# Patient Record
Sex: FEMALE | Race: WHITE | NOT HISPANIC OR LATINO | Employment: UNEMPLOYED | URBAN - METROPOLITAN AREA
[De-identification: names, ages, dates, MRNs, and addresses within clinical notes are randomized per-mention and may not be internally consistent; named-entity substitution may affect disease eponyms.]

---

## 2018-08-11 ENCOUNTER — OFFICE VISIT (OUTPATIENT)
Dept: URGENT CARE | Facility: CLINIC | Age: 23
End: 2018-08-11
Payer: COMMERCIAL

## 2018-08-11 ENCOUNTER — HOSPITAL ENCOUNTER (EMERGENCY)
Facility: MEDICAL CENTER | Age: 23
End: 2018-08-11
Attending: EMERGENCY MEDICINE
Payer: COMMERCIAL

## 2018-08-11 ENCOUNTER — APPOINTMENT (OUTPATIENT)
Dept: RADIOLOGY | Facility: MEDICAL CENTER | Age: 23
End: 2018-08-11
Attending: EMERGENCY MEDICINE
Payer: COMMERCIAL

## 2018-08-11 VITALS
TEMPERATURE: 97.4 F | OXYGEN SATURATION: 98 % | BODY MASS INDEX: 23.71 KG/M2 | HEART RATE: 80 BPM | HEIGHT: 64 IN | DIASTOLIC BLOOD PRESSURE: 78 MMHG | WEIGHT: 138.89 LBS | SYSTOLIC BLOOD PRESSURE: 112 MMHG | RESPIRATION RATE: 18 BRPM

## 2018-08-11 VITALS
SYSTOLIC BLOOD PRESSURE: 120 MMHG | WEIGHT: 140 LBS | OXYGEN SATURATION: 98 % | BODY MASS INDEX: 23.9 KG/M2 | HEIGHT: 64 IN | TEMPERATURE: 98.1 F | DIASTOLIC BLOOD PRESSURE: 80 MMHG | RESPIRATION RATE: 20 BRPM | HEART RATE: 88 BPM

## 2018-08-11 DIAGNOSIS — H92.01 RIGHT EAR PAIN: ICD-10-CM

## 2018-08-11 DIAGNOSIS — R59.0 LYMPHADENOPATHY OF RIGHT CERVICAL REGION: ICD-10-CM

## 2018-08-11 LAB
ANION GAP SERPL CALC-SCNC: 6 MMOL/L (ref 0–11.9)
BASOPHILS # BLD AUTO: 0.2 % (ref 0–1.8)
BASOPHILS # BLD: 0.02 K/UL (ref 0–0.12)
BUN SERPL-MCNC: 9 MG/DL (ref 8–22)
CALCIUM SERPL-MCNC: 9 MG/DL (ref 8.4–10.2)
CHLORIDE SERPL-SCNC: 104 MMOL/L (ref 96–112)
CO2 SERPL-SCNC: 26 MMOL/L (ref 20–33)
CREAT SERPL-MCNC: 0.61 MG/DL (ref 0.5–1.4)
EOSINOPHIL # BLD AUTO: 0.05 K/UL (ref 0–0.51)
EOSINOPHIL NFR BLD: 0.5 % (ref 0–6.9)
ERYTHROCYTE [DISTWIDTH] IN BLOOD BY AUTOMATED COUNT: 42.3 FL (ref 35.9–50)
GLUCOSE SERPL-MCNC: 95 MG/DL (ref 65–99)
HCT VFR BLD AUTO: 41.5 % (ref 37–47)
HETEROPH AB SER QL LA: NEGATIVE
HGB BLD-MCNC: 14.2 G/DL (ref 12–16)
IMM GRANULOCYTES # BLD AUTO: 0.03 K/UL (ref 0–0.11)
IMM GRANULOCYTES NFR BLD AUTO: 0.3 % (ref 0–0.9)
INT CON NEG: NORMAL
INT CON POS: NORMAL
LYMPHOCYTES # BLD AUTO: 1.85 K/UL (ref 1–4.8)
LYMPHOCYTES NFR BLD: 19.2 % (ref 22–41)
MCH RBC QN AUTO: 31 PG (ref 27–33)
MCHC RBC AUTO-ENTMCNC: 34.2 G/DL (ref 33.6–35)
MCV RBC AUTO: 90.6 FL (ref 81.4–97.8)
MONOCYTES # BLD AUTO: 0.61 K/UL (ref 0–0.85)
MONOCYTES NFR BLD AUTO: 6.3 % (ref 0–13.4)
NEUTROPHILS # BLD AUTO: 7.07 K/UL (ref 2–7.15)
NEUTROPHILS NFR BLD: 73.5 % (ref 44–72)
NRBC # BLD AUTO: 0 K/UL
NRBC BLD-RTO: 0 /100 WBC
PLATELET # BLD AUTO: 157 K/UL (ref 164–446)
PMV BLD AUTO: 10.7 FL (ref 9–12.9)
POTASSIUM SERPL-SCNC: 3.7 MMOL/L (ref 3.6–5.5)
RBC # BLD AUTO: 4.58 M/UL (ref 4.2–5.4)
SODIUM SERPL-SCNC: 136 MMOL/L (ref 135–145)
WBC # BLD AUTO: 9.6 K/UL (ref 4.8–10.8)

## 2018-08-11 PROCEDURE — 36415 COLL VENOUS BLD VENIPUNCTURE: CPT

## 2018-08-11 PROCEDURE — 86308 HETEROPHILE ANTIBODY SCREEN: CPT | Performed by: PHYSICIAN ASSISTANT

## 2018-08-11 PROCEDURE — 76536 US EXAM OF HEAD AND NECK: CPT

## 2018-08-11 PROCEDURE — 700102 HCHG RX REV CODE 250 W/ 637 OVERRIDE(OP): Performed by: EMERGENCY MEDICINE

## 2018-08-11 PROCEDURE — 85025 COMPLETE CBC W/AUTO DIFF WBC: CPT

## 2018-08-11 PROCEDURE — A9270 NON-COVERED ITEM OR SERVICE: HCPCS | Performed by: EMERGENCY MEDICINE

## 2018-08-11 PROCEDURE — 99284 EMERGENCY DEPT VISIT MOD MDM: CPT

## 2018-08-11 PROCEDURE — 99203 OFFICE O/P NEW LOW 30 MIN: CPT | Performed by: PHYSICIAN ASSISTANT

## 2018-08-11 PROCEDURE — 80048 BASIC METABOLIC PNL TOTAL CA: CPT

## 2018-08-11 RX ORDER — ACETAMINOPHEN 325 MG/1
650 TABLET ORAL EVERY 6 HOURS PRN
Qty: 30 TAB | Refills: 0 | Status: SHIPPED | OUTPATIENT
Start: 2018-08-11

## 2018-08-11 RX ORDER — AMOXICILLIN 250 MG/1
250 CAPSULE ORAL 3 TIMES DAILY
COMMUNITY

## 2018-08-11 RX ORDER — IBUPROFEN 800 MG/1
800 TABLET ORAL EVERY 8 HOURS PRN
Qty: 30 TAB | Refills: 0 | Status: SHIPPED | OUTPATIENT
Start: 2018-08-11

## 2018-08-11 RX ORDER — PSEUDOEPHEDRINE HCL 120 MG/1
120 TABLET, FILM COATED, EXTENDED RELEASE ORAL 2 TIMES DAILY PRN
Qty: 30 TAB | Refills: 6 | Status: SHIPPED | OUTPATIENT
Start: 2018-08-11 | End: 2018-08-16

## 2018-08-11 RX ORDER — HYDROCODONE BITARTRATE AND ACETAMINOPHEN 5; 325 MG/1; MG/1
1 TABLET ORAL ONCE
Status: COMPLETED | OUTPATIENT
Start: 2018-08-11 | End: 2018-08-11

## 2018-08-11 RX ORDER — CLINDAMYCIN HYDROCHLORIDE 300 MG/1
300 CAPSULE ORAL 4 TIMES DAILY
Qty: 28 CAP | Refills: 0 | Status: SHIPPED | OUTPATIENT
Start: 2018-08-11 | End: 2018-08-18

## 2018-08-11 RX ADMIN — HYDROCODONE BITARTRATE AND ACETAMINOPHEN 1 TABLET: 5; 325 TABLET ORAL at 21:15

## 2018-08-11 ASSESSMENT — ENCOUNTER SYMPTOMS
HEADACHES: 0
DIZZINESS: 0
ABDOMINAL PAIN: 0
HEADACHES: 0
NAUSEA: 0
SORE THROAT: 0
VOMITING: 0
DIARRHEA: 1
FEVER: 0
WEIGHT LOSS: 0
SHORTNESS OF BREATH: 0
FEVER: 0
BLURRED VISION: 0
NECK PAIN: 0
CHILLS: 0
CHILLS: 0
VOMITING: 0
COUGH: 0

## 2018-08-11 ASSESSMENT — PAIN SCALES - GENERAL
PAINLEVEL_OUTOF10: 4
PAINLEVEL_OUTOF10: 9

## 2018-08-11 NOTE — PROGRESS NOTES
"Subjective:      Suyapa Patricia is a 22 y.o. female who presents with Otalgia (Over a week right ear pain )            Subjective: Patient is a 22-year-old female who comes in with right ear pain and right neck pain. It is been 8 days and getting worse. She was seen at [facility] and told she had a right ear infection about a week ago. She believes she was put on amoxicillin/clavulanate. She's been taking the medication but states symptoms have worsened. She reports pain that is just below the ear and extends down the right side of her neck. She denies pain with swallowing. She denies ringing of the ear, decreased hearing. She has not had any fever, chills, nauseousness or vomiting. Denies pain with eating or chewing. Reports the pain about a 7 or 8 out of 10. It's rated as a moderate severity. Nothing makes better and nothing makes it worse    Past medical history: Is not pertinent to this examination  Past surgical history: Not pertinent to this examination  Family history: Is not pertinent to this examination  Allergies: No known drug allergies  Social history: Is reviewed in Epic        Otalgia    Pertinent negatives include no abdominal pain, coughing, ear discharge, headaches, hearing loss or vomiting.       Review of Systems   Constitutional: Negative for chills and fever.   HENT: Positive for ear pain. Negative for congestion, ear discharge, hearing loss and tinnitus.    Eyes: Negative for blurred vision.   Respiratory: Negative for cough.    Cardiovascular: Negative for chest pain.   Gastrointestinal: Negative for abdominal pain, nausea and vomiting.   Genitourinary: Negative.    Neurological: Negative for dizziness and headaches.          Objective:     /80   Pulse 88   Temp 36.7 °C (98.1 °F)   Resp 20   Ht 1.626 m (5' 4\")   Wt 63.5 kg (140 lb)   SpO2 98%   BMI 24.03 kg/m²      Physical Exam       Gen.: Patient is A and O ×3, no acute distress, well-nourished " well-hydrated  Vitals: Are listed and unremarkable  HEENT: Heads normocephalic, atraumatic, PERRLA, extraocular movements intact, , right TM is bulging but there is no redness or pus behind the TM. Oropharynx clear  Neck: Soft supple with significant right sided anterior, tender, cervical lymphadenopathy. There is no redness to the neck or swelling externally. But she has palpable right-sided tender anterior cervical lymphadenopathy  Cardiovascular: Regular rate and rhythm normal S1 and S2. No murmurs, rubs or gallops  Lungs are clear to auscultation bilaterally. no wheezes rales or rhonchi  Abdomen is soft, nontender, nondistended with good bowel sounds, no hepatosplenomegaly  Skin: Is well perfused without evidence of rash or lesions  Neurological:  cranial nerves II through XII were assessed and intact.  Musculoskeletal: Full range of motion, 5 out of 5 strength against resistance  Neurovascularly: Intact with a 2 second cap refill, good distal pulses    Urgent care course: Monospot was negative     Assessment/Plan:     1. Lymphadenopathy of right cervical region  Appears that The patient was on Augmentin and is failing this. Discuss stopping that medication and starting clindamycin. Take ibuprofen 600 mg up to 3 times a day as needed. If symptoms are persisting or worsening in the next 24-48 hours please go straight to the emergency room  - clindamycin (CLEOCIN) 300 MG Cap; Take 1 Cap by mouth 4 times a day for 7 days.  Dispense: 28 Cap; Refill: 0

## 2018-08-12 NOTE — ED PROVIDER NOTES
CHIEF COMPLAINT  Chief Complaint   Patient presents with   • Ear Pain   • Lymphadenopathy       HPI    Suyapa Patricia is a 22 y.o. female presenting with right neck swelling and ear pain.  Patient diagnosed with ear infection 1.5 weeks ago completed a course of amoxicillin, or Augmentin, unclear.  Patient has a continued earache, right-sided, 6/10 intensity, no modifying factors, constant.  States Tylenol and ibuprofen are not helping.  Feels that she has worsening swelling on the right side of her neck at her jawline.  Patient states strep test and mono test negative.  Also having mild diarrhea.   Denies sore throat, fevers, sudden weight loss, chills or night sweats, cough, difficulty swallowing.        REVIEW OF SYSTEMS  See HPI for further details.     Review of Systems   Constitutional: Negative for chills, fever and weight loss.   HENT: Negative for sore throat.    Respiratory: Negative for shortness of breath.    Cardiovascular: Negative for chest pain.   Gastrointestinal: Positive for diarrhea. Negative for vomiting.   Musculoskeletal: Negative for neck pain.   Skin: Negative for rash.   Neurological: Negative for headaches.   All other systems reviewed and are negative.      PAST MEDICAL HISTORY       SOCIAL HISTORY  Social History     Social History Main Topics   • Smoking status: Never Smoker   • Smokeless tobacco: Never Used   • Alcohol use No   • Drug use: No   • Sexual activity: Not on file       SURGICAL HISTORY  patient denies any surgical history    CURRENT MEDICATIONS  Home Medications     Reviewed by Tr Jackson R.N. (Registered Nurse) on 08/11/18 at 2013  Med List Status: Partial   Medication Last Dose Status   amoxicillin (AMOXIL) 250 MG Cap 8/10/2018 Active   clindamycin (CLEOCIN) 300 MG Cap 8/11/2018 Active   cyclobenzaprine (FLEXERIL) 5 MG tablet Not Taking Active   cyclobenzaprine (FLEXERIL) 5 MG tablet Not taking Active   hydrocodone/acetaminophen (NORCO)  MG TABS Not Taking  "Active   ibuprofen (MOTRIN) 800 MG TABS PRN Active   oxycodone-acetaminophen (PERCOCET) 7.5-325 MG per tablet  Active                ALLERGIES  No Known Allergies    PHYSICAL EXAM  VITAL SIGNS: /86   Pulse 88   Temp 36.4 °C (97.6 °F)   Resp 18   Ht 1.626 m (5' 4\")   Wt 63 kg (138 lb 14.2 oz)   SpO2 98%   BMI 23.84 kg/m²    SpO2: I interpret this pulse oximetry as normal  Physical Exam   Constitutional: She is oriented to person, place, and time and well-developed, well-nourished, and in no distress.   HENT:   Head: Normocephalic and atraumatic.   Mouth/Throat: Oropharynx is clear and moist. No oropharyngeal exudate.   Bilateral TMs normal  Uvula midline, no tonsillar swelling or mass noted, no erythema or exudate  Right anterior tender lymphadenopathy, no area of fluctuance, no erythema or induration  No TMJ tenderness  No submandibular swelling or signs of Jesse's angina  No trismus  No decayed teeth or gingival swelling concerning for apical abscess  No mastoid tenderness or swelling   Eyes: Pupils are equal, round, and reactive to light. Conjunctivae and EOM are normal.   Neck: Normal range of motion. Neck supple. No thyromegaly present.   Cardiovascular: Normal rate, regular rhythm and normal heart sounds.    No murmur heard.  Pulmonary/Chest: Effort normal and breath sounds normal. No respiratory distress. She has no wheezes. She has no rales.   Abdominal: Soft. Bowel sounds are normal. She exhibits no distension. There is no tenderness.   Musculoskeletal: Normal range of motion. She exhibits no edema or deformity.   Neurological: She is alert and oriented to person, place, and time.   Skin: Skin is warm and dry. She is not diaphoretic. No erythema.   Psychiatric: Affect and judgment normal.   Nursing note and vitals reviewed.      DIAGNOSTIC STUDIES / PROCEDURES      Results for orders placed or performed during the hospital encounter of 08/11/18   CBC WITH DIFFERENTIAL   Result Value Ref Range " "   WBC 9.6 4.8 - 10.8 K/uL    RBC 4.58 4.20 - 5.40 M/uL    Hemoglobin 14.2 12.0 - 16.0 g/dL    Hematocrit 41.5 37.0 - 47.0 %    MCV 90.6 81.4 - 97.8 fL    MCH 31.0 27.0 - 33.0 pg    MCHC 34.2 33.6 - 35.0 g/dL    RDW 42.3 35.9 - 50.0 fL    Platelet Count 157 (L) 164 - 446 K/uL    MPV 10.7 9.0 - 12.9 fL    Neutrophils-Polys 73.50 (H) 44.00 - 72.00 %    Lymphocytes 19.20 (L) 22.00 - 41.00 %    Monocytes 6.30 0.00 - 13.40 %    Eosinophils 0.50 0.00 - 6.90 %    Basophils 0.20 0.00 - 1.80 %    Immature Granulocytes 0.30 0.00 - 0.90 %    Nucleated RBC 0.00 /100 WBC    Neutrophils (Absolute) 7.07 2.00 - 7.15 K/uL    Lymphs (Absolute) 1.85 1.00 - 4.80 K/uL    Monos (Absolute) 0.61 0.00 - 0.85 K/uL    Eos (Absolute) 0.05 0.00 - 0.51 K/uL    Baso (Absolute) 0.02 0.00 - 0.12 K/uL    Immature Granulocytes (abs) 0.03 0.00 - 0.11 K/uL    NRBC (Absolute) 0.00 K/uL   BASIC METABOLIC PANEL   Result Value Ref Range    Sodium 136 135 - 145 mmol/L    Potassium 3.7 3.6 - 5.5 mmol/L    Chloride 104 96 - 112 mmol/L    Co2 26 20 - 33 mmol/L    Glucose 95 65 - 99 mg/dL    Bun 9 8 - 22 mg/dL    Creatinine 0.61 0.50 - 1.40 mg/dL    Calcium 9.0 8.4 - 10.2 mg/dL    Anion Gap 6.0 0.0 - 11.9   ESTIMATED GFR   Result Value Ref Range    GFR If African American >60 >60 mL/min/1.73 m 2    GFR If Non African American >60 >60 mL/min/1.73 m 2         RADIOLOGY    US-SOFT TISSUES OF HEAD - NECK   Final Result      Multiple bilateral cervical lymph nodes the largest of which measures 7 mm in short axis dimension.                CHART REVIEW  Pertinent medical chart information was reviewed and reveals: Urgent care visit today, discharged on clindamycin    COURSE & MEDICAL DECISION MAKING  Pertinent Labs & Imaging studies reviewed. (See chart for details)    Vitals:    08/11/18 2008 08/11/18 2010   BP:  121/86   Pulse:  88   Resp:  18   Temp:  36.4 °C (97.6 °F)   SpO2:  98%   Weight: 63 kg (138 lb 14.2 oz)    Height: 1.626 m (5' 4\")        22-year-old " female presented for earache and right cervical lymphadenopathy.  Outpatient workup consisting of Augmentin treatment which per urgent care note is failing, started on clindamycin today.  Patient with no knee symptoms or other concerning findings per history and exam.  No evidence of abscess.  Laboratory work normal, ultrasound negative.  No signs of serious soft tissue infection, mastoiditis, Jesse's angina, deep space infection, lymphoma or other serious medical or surgical illness.  Patient's pain well controlled in department.  Also no evidence of otitis media or externa on exam.  Given supportive care instructions for congestion and ear pain.  Instructed on proper dosing of Tylenol and ibuprofen.  Advised to continue clindamycin.    Patient or guardian given strict returns precautions and care instructions.  Advised PCP follow-up in 1-2 days.  Patient or guardian expresses understanding and agrees to plan.      DISPOSITION  Discharged home in improved condition    FINAL IMPRESSION  Visit Diagnoses     ICD-10-CM   1. Lymphadenopathy of right cervical region R59.0   2. Right ear pain H92.01            Electronically signed by: Howie Edward, 8/11/2018 8:53 PM

## 2018-08-12 NOTE — ED NOTES
Pt was seen at [facility] and discharged with a Dx of lymphadenopathy of right cervical region.

## 2018-08-12 NOTE — DISCHARGE INSTRUCTIONS
Cervical Adenitis  You have a swollen lymph gland in your neck. This commonly happens with Strep and virus infections, dental problems, insect bites, and injuries about the face, scalp, or neck. The lymph glands swell as the body fights the infection or heals the injury. Swelling and firmness typically lasts for several weeks after the infection or injury is healed. Rarely lymph glands can become swollen because of cancer or TB.  Antibiotics are prescribed if there is evidence of an infection. Sometimes an infected lymph gland becomes filled with pus. This condition may require opening up the abscessed gland by draining it surgically. Most of the time infected glands return to normal within two weeks. Do not poke or squeeze the swollen lymph nodes. That may keep them from shrinking back to their normal size. If the lymph gland is still swollen after 2 weeks, further medical evaluation is needed.   SEEK IMMEDIATE MEDICAL CARE IF:   You have difficulty swallowing or breathing, increased swelling, severe pain, or a high fever.   Document Released: 12/18/2006 Document Revised: 03/11/2013 Document Reviewed: 06/08/2008  [employer]® Patient Information ©2014 [employer].    Earache, Adult  An earache, or ear pain, can be caused by many things, including:  · An infection.  · Ear wax buildup.  · Ear pressure.  · Something in the ear that should not be there (foreign body).  · A sore throat.  · Tooth problems.  · Jaw problems.  Treatment of the earache will depend on the cause. If the cause is not clear or cannot be determined, you may need to watch your symptoms until your earache goes away or until a cause is found.  Follow these instructions at home:  Pay attention to any changes in your symptoms. Take these actions to help with your pain:  · Take or apply over-the-counter and prescription medicines only as told by your health care provider.  · If you were prescribed an antibiotic medicine, use it as told by your health  care provider. Do not stop using the antibiotic even if you start to feel better.  · Do not put anything in your ear other than medicine that is prescribed by your health care provider.  · If directed, apply heat to the affected area as often as told by your health care provider. Use the heat source that your health care provider recommends, such as a moist heat pack or a heating pad.  ¨ Place a towel between your skin and the heat source.  ¨ Leave the heat on for 20-30 minutes.  ¨ Remove the heat if your skin turns bright red. This is especially important if you are unable to feel pain, heat, or cold. You may have a greater risk of getting burned.  · If directed, put ice on the ear:  ¨ Put ice in a plastic bag.  ¨ Place a towel between your skin and the bag.  ¨ Leave the ice on for 20 minutes, 2-3 times a day.  · Try resting in an upright position instead of lying down. This may help to reduce pressure in your ear and relieve pain.  · Chew gum if it helps to relieve your ear pain.  · Treat any allergies as told by your health care provider.  · Keep all follow-up visits as told by your health care provider. This is important.  Contact a health care provider if:  · Your pain does not improve within 2 days.  · Your earache gets worse.  · You have new symptoms.  · You have a fever.  Get help right away if:  · You have a severe headache.  · You have a stiff neck.  · You have trouble swallowing.  · You have redness or swelling behind your ear.  · You have fluid or blood coming from your ear.  · You have hearing loss.  · You feel dizzy.  This information is not intended to replace advice given to you by your health care provider. Make sure you discuss any questions you have with your health care provider.  Document Released: 08/04/2005 Document Revised: 08/15/2017 Document Reviewed: 06/12/2017  [employer] Patient Education © 2017 [employer]    Sinus Rinse  WHAT IS A SINUS RINSE?  A sinus rinse is a simple  home treatment that is used to rinse your sinuses with a sterile mixture of salt and water (saline solution). Sinuses are air-filled spaces in your skull behind the bones of your face and forehead that open into your nasal cavity.  You will use the following:  · Saline solution.  · Neti pot or spray bottle. This releases the saline solution into your nose and through your sinuses. Neti pots and spray bottles can be purchased at your local pharmacy, a health food store, or online.  WHEN WOULD I DO A SINUS RINSE?  A sinus rinse can help to clear mucus, dirt, dust, or pollen from the nasal cavity. You may do a sinus rinse when you have a cold, a virus, nasal allergy symptoms, a sinus infection, or stuffiness in the nose or sinuses.  If you are considering a sinus rinse:  · Ask your child's health care provider before performing a sinus rinse on your child.  · Do not do a sinus rinse if you have had ear or nasal surgery, ear infection, or blocked ears.  HOW DO I DO A SINUS RINSE?  · Wash your hands.  · Disinfect your device according to the directions provided and then dry it.  · Use the solution that comes with your device or one that is sold separately in stores. Follow the mixing directions on the package.  · Fill your device with the amount of saline solution as directed by the device instructions.  · Stand over a sink and tilt your head sideways over the sink.  · Place the spout of the device in your upper nostril (the one closer to the ceiling).  · Gently pour or squeeze the saline solution into the nasal cavity. The liquid should drain to the lower nostril if you are not overly congested.  · Gently blow your nose. Blowing too hard may cause ear pain.  · Repeat in the other nostril.  · Clean and rinse your device with clean water and then air-dry it.  ARE THERE RISKS OF A SINUS RINSE?  Sinus rinse is generally very safe and effective. However, there are a few risks, which include:  · A burning sensation in the  sinuses. This may happen if you do not make the saline solution as directed. Make sure to follow all directions when making the saline solution.  · Infection from contaminated water. This is rare, but possible.  · Nasal irritation.  This information is not intended to replace advice given to you by your health care provider. Make sure you discuss any questions you have with your health care provider.  Document Released: 07/15/2015 Document Revised: 08/13/2017 Document Reviewed: 05/05/2015  [employer] Interactive Patient Education © 2017 [employer]